# Patient Record
Sex: MALE | Race: WHITE | NOT HISPANIC OR LATINO | Employment: FULL TIME | URBAN - METROPOLITAN AREA
[De-identification: names, ages, dates, MRNs, and addresses within clinical notes are randomized per-mention and may not be internally consistent; named-entity substitution may affect disease eponyms.]

---

## 2021-12-21 ENCOUNTER — TRANSCRIBE ORDERS (OUTPATIENT)
Dept: CARDIOLOGY | Facility: CLINIC | Age: 31
End: 2021-12-21

## 2021-12-21 DIAGNOSIS — Z00.00 PHYSICAL EXAM: Primary | ICD-10-CM

## 2021-12-28 ENCOUNTER — HOSPITAL ENCOUNTER (OUTPATIENT)
Dept: CARDIOLOGY | Facility: HOSPITAL | Age: 31
Discharge: HOME | End: 2021-12-28

## 2021-12-28 DIAGNOSIS — Z00.00 PHYSICAL EXAM: ICD-10-CM

## 2021-12-28 LAB
BH CV STRESS BP STAGE 1: NORMAL
BH CV STRESS BP STAGE 2: NORMAL
BH CV STRESS BP STAGE 3: NORMAL
BH CV STRESS DURATION MIN STAGE 1: 3
BH CV STRESS DURATION MIN STAGE 2: 3
BH CV STRESS DURATION MIN STAGE 3: 3
BH CV STRESS DURATION SEC STAGE 1: 0
BH CV STRESS DURATION SEC STAGE 2: 0
BH CV STRESS DURATION SEC STAGE 3: 0
BH CV STRESS GRADE STAGE 1: 10
BH CV STRESS GRADE STAGE 2: 12
BH CV STRESS GRADE STAGE 3: 14
BH CV STRESS HR STAGE 1: 95
BH CV STRESS HR STAGE 2: 113
BH CV STRESS HR STAGE 3: 150
BH CV STRESS METS STAGE 1: 5
BH CV STRESS METS STAGE 2: 7.5
BH CV STRESS METS STAGE 3: 10
BH CV STRESS PROTOCOL 1: NORMAL
BH CV STRESS RECOVERY BP: NORMAL MMHG
BH CV STRESS RECOVERY HR: 100 BPM
BH CV STRESS SPEED STAGE 1: 1.7
BH CV STRESS SPEED STAGE 2: 2.5
BH CV STRESS SPEED STAGE 3: 3.4
BH CV STRESS STAGE 1: 1
BH CV STRESS STAGE 2: 2
BH CV STRESS STAGE 3: 3
MAXIMAL PREDICTED HEART RATE: 190 BPM
PERCENT MAX PREDICTED HR: 78.95 %
STRESS BASELINE BP: NORMAL MMHG
STRESS BASELINE HR: 75 BPM
STRESS PERCENT HR: 93 %
STRESS POST ESTIMATED WORKLOAD: 11 METS
STRESS POST EXERCISE DUR MIN: 10 MIN
STRESS POST EXERCISE DUR SEC: 30 SEC
STRESS POST PEAK BP: NORMAL MMHG
STRESS POST PEAK HR: 150 BPM
STRESS TARGET HR: 162 BPM

## 2021-12-28 PROCEDURE — 93017 CV STRESS TEST TRACING ONLY: CPT

## 2021-12-28 PROCEDURE — 93018 CV STRESS TEST I&R ONLY: CPT | Performed by: INTERNAL MEDICINE

## 2021-12-28 PROCEDURE — 93016 CV STRESS TEST SUPVJ ONLY: CPT | Performed by: INTERNAL MEDICINE

## 2022-03-11 ENCOUNTER — OFFICE VISIT (OUTPATIENT)
Dept: FAMILY MEDICINE CLINIC | Facility: CLINIC | Age: 32
End: 2022-03-11

## 2022-03-11 VITALS
HEIGHT: 71 IN | SYSTOLIC BLOOD PRESSURE: 124 MMHG | BODY MASS INDEX: 29.12 KG/M2 | WEIGHT: 208 LBS | TEMPERATURE: 97 F | RESPIRATION RATE: 16 BRPM | DIASTOLIC BLOOD PRESSURE: 79 MMHG | OXYGEN SATURATION: 99 % | HEART RATE: 68 BPM

## 2022-03-11 DIAGNOSIS — E78.2 MODERATE MIXED HYPERLIPIDEMIA NOT REQUIRING STATIN THERAPY: ICD-10-CM

## 2022-03-11 DIAGNOSIS — F90.9 ATTENTION DEFICIT HYPERACTIVITY DISORDER (ADHD), UNSPECIFIED ADHD TYPE: ICD-10-CM

## 2022-03-11 DIAGNOSIS — M25.561 ACUTE PAIN OF RIGHT KNEE: Primary | ICD-10-CM

## 2022-03-11 PROCEDURE — 73560 X-RAY EXAM OF KNEE 1 OR 2: CPT | Performed by: NURSE PRACTITIONER

## 2022-03-11 PROCEDURE — 99203 OFFICE O/P NEW LOW 30 MIN: CPT | Performed by: NURSE PRACTITIONER

## 2022-03-11 NOTE — PROGRESS NOTES
"Chief Complaint  Establish Care and Knee Pain    Subjective          Delfino presents to [facility] PRIMARY [facility]    31 year old male presented to the clinic today to establish care and to discuss some right knee pain.  He has had a previous right knee injury in high school.  He has not had any problems with this recently.  Yesterday he states that he hyper-extended his knee and fell and has been having some knee pain since that time.  He denies any swelling.  No bruising or laceration.  Pain is aching and intermittent 5/10.  He has not taken any medication or worn a brace to see if this helps.  Movement and climbing make it worse.      He is requesting an xray at this time.    He also was diagnosed with ADHD as a child.  He has not taken any medication since that time.  He would like a referral to be evaluated to possibly resume some medication.  He is having an increased hard time focusing and completing tasks at a new job.      He had recent lab work complete with his new job at [employer].    He has no other c/o today      Review of Systems   Constitutional: Negative for chills, fatigue and fever.   Eyes: Negative for visual disturbance.   Respiratory: Negative for cough and shortness of breath.    Gastrointestinal: Negative for abdominal pain, diarrhea, nausea and vomiting.   Musculoskeletal: Positive for arthralgias. Negative for joint swelling.   Neurological: Negative for dizziness and numbness.        Objective   Vital Signs:   Vitals:    03/11/22 0808   BP: 124/79   Pulse: 68   Resp: 16   Temp: 97 °F (36.1 °C)   TempSrc: Skin   SpO2: 99%   Weight: 94.3 kg (208 lb)   Height: 180.3 cm (71\")        Physical Exam  Vitals reviewed.   Constitutional:       Appearance: Normal appearance. He is not ill-appearing.   HENT:      Head: Normocephalic.   Neck:      Vascular: No carotid bruit.   Cardiovascular:      Rate and Rhythm: Normal rate and regular rhythm.      Pulses: Normal pulses.      Heart " sounds: Normal heart sounds. No murmur heard.  Pulmonary:      Effort: Pulmonary effort is normal.      Breath sounds: Normal breath sounds.   Musculoskeletal:      Cervical back: Neck supple.      Right lower leg: No swelling or lacerations.        Legs:       Comments: Negative anterior and posterior drawer  No bruising, swelling , lacerations noted.  Non tender   Skin:     General: Skin is warm.   Neurological:      Mental Status: He is alert and oriented to person, place, and time.   Psychiatric:         Mood and Affect: Mood normal.         Behavior: Behavior normal.         Thought Content: Thought content normal.         Judgment: Judgment normal.          Result Review :     The following data was reviewed by: KAVITA Tyson on 03/11/2022:      [facility] Panel 3 (12/28/2021 10:48)- lipids elevated  Total 217  Trig 156  ldl  148  owrk on diet and exercise-  Repeat 6 months    HIV-1 / O / 2 Ag / Antibody 4th Generation (12/28/2021 10:48)          Assessment and Plan    Diagnoses and all orders for this visit:    1. Acute pain of right knee (Primary)  Comments:  injury 3/11/2022  [facility]ICE  recommend wearing a brace to work  declined PT at this time  xray pending radiology review  Orders:  -     XR Knee 1 or 2 View Right (In Office)    2. Attention deficit hyperactivity disorder (ADHD), unspecified ADHD type  Comments:  As a child.  No medication at this time  referral for evaluation  Orders:  -     Ambulatory Referral to Psychiatry  -     Comprehensive metabolic panel; Future  -     Lipid panel; Future  -     CBC & Differential; Future  -     T4, Free; Future  -     TSH; Future  -     Hepatitis C Antibody; Future    3. Moderate mixed hyperlipidemia not requiring statin therapy  -     Comprehensive metabolic panel; Future  -     Lipid panel; Future  -     CBC & Differential; Future  -     T4, Free; Future  -     TSH; Future  -     Hepatitis C Antibody; Future        Follow Up   Return in about 6 months  (around 9/11/2022) for Annual physical.  Patient was given instructions and counseling regarding his condition or for health maintenance advice. Please see specific information pulled into the AVS if appropriate.

## 2022-04-07 ENCOUNTER — APPOINTMENT (OUTPATIENT)
Dept: GENERAL RADIOLOGY | Facility: HOSPITAL | Age: 32
End: 2022-04-07

## 2022-04-07 PROCEDURE — 71046 X-RAY EXAM CHEST 2 VIEWS: CPT | Performed by: GENERAL PRACTICE

## 2022-12-07 ENCOUNTER — OFFICE VISIT (OUTPATIENT)
Dept: FAMILY MEDICINE CLINIC | Facility: CLINIC | Age: 32
End: 2022-12-07

## 2022-12-07 VITALS
TEMPERATURE: 97.4 F | HEART RATE: 57 BPM | WEIGHT: 203 LBS | SYSTOLIC BLOOD PRESSURE: 102 MMHG | BODY MASS INDEX: 28.42 KG/M2 | DIASTOLIC BLOOD PRESSURE: 59 MMHG | HEIGHT: 71 IN | RESPIRATION RATE: 16 BRPM | OXYGEN SATURATION: 99 %

## 2022-12-07 DIAGNOSIS — F90.9 ATTENTION DEFICIT HYPERACTIVITY DISORDER (ADHD), UNSPECIFIED ADHD TYPE: ICD-10-CM

## 2022-12-07 DIAGNOSIS — F43.10 PTSD (POST-TRAUMATIC STRESS DISORDER): ICD-10-CM

## 2022-12-07 DIAGNOSIS — R59.9 LYMPH NODE ENLARGEMENT: Primary | ICD-10-CM

## 2022-12-07 PROCEDURE — 99214 OFFICE O/P EST MOD 30 MIN: CPT | Performed by: NURSE PRACTITIONER

## 2022-12-07 RX ORDER — BUPROPION HYDROCHLORIDE 150 MG/1
150 TABLET ORAL DAILY
Qty: 30 TABLET | Refills: 1 | Status: SHIPPED | OUTPATIENT
Start: 2022-12-07 | End: 2023-02-05

## 2022-12-07 RX ORDER — DOXYCYCLINE 100 MG/1
100 TABLET ORAL 2 TIMES DAILY
Qty: 20 TABLET | Refills: 0 | Status: SHIPPED | OUTPATIENT
Start: 2022-12-07 | End: 2022-12-17

## 2022-12-07 NOTE — PROGRESS NOTES
"Chief Complaint  SWELLING IN GROIN AREA    Subjective          Delfino presents to [facility]  As a 31 year old male c/o swollen lymph node in the Groin area.  He states he went in for his annual physical at work and noticed some swelling in his groin area.  He denies any tenderness,  He states the one on the left was been there for a bout a month and he had not noticed the one on the right.  They are mobile, non tender.  He denies any recent illness or infection.    Denies any dysuria, rash, swelling, discharge.  No history of any STD.  He is monogamous with his wife.  He declined std testing today  He denies any cuts or abrasions on his lower body  NO fever, weight change, night sweats.      History of ADHD and PTSD is seeing a therapist, and they have discussed talking to his PCP about trying welbutrin.  He has not been on any antidepressant medication in the past, but would like to try this medication.  Denies any suicidal or homicidal ideations.    Delfino Cageal male 31 y.o., /59   Pulse 57   Temp 97.4 °F (36.3 °C) (Oral)   Resp 16   Ht 180.3 cm (71\")   Wt 92.1 kg (203 lb)   SpO2 99%   BMI 28.31 kg/m²   who presents today for follow up of PTSD and ADHD.  He reports excessive worry, unable to relax and irritable. Onset of symptoms was approximately several years ago.  He denies current suicidal and homicidal ideation. Risk factors are family history of anxiety and or depression, lifestyle of multiple roles, family situation/stress, job stress, prior diagnosis of depression and childhood ADD/ADHD.  Previous treatment includes seeing psychotherapist     He has no other c/o today  The following portions of the patient's history were reviewed and updated as appropriate: allergies, current medications, past family history, past medical history, past social history, past surgical history, and problem list          Review of Systems   Constitutional: Negative for chills, " "diaphoresis, fatigue and fever.   HENT: Negative for congestion.    Eyes: Negative for visual disturbance.   Respiratory: Negative for cough and shortness of breath.    Cardiovascular: Negative for chest pain, palpitations and leg swelling.   Genitourinary: Negative for decreased urine volume, discharge, dysuria, frequency, penile pain, penile swelling, scrotal swelling, testicular pain and urgency.   Neurological: Negative for dizziness and light-headedness.        Objective   Vital Signs:   Vitals:    12/07/22 0808   BP: 102/59   Pulse: 57   Resp: 16   Temp: 97.4 °F (36.3 °C)   TempSrc: Oral   SpO2: 99%   Weight: 92.1 kg (203 lb)   Height: 180.3 cm (71\")        BMI is >= 25 and <30. (Overweight) The following options were offered after discussion;: weight loss educational material (shared in after visit summary)        Physical Exam  Vitals reviewed.   Constitutional:       General: He is not in acute distress.  Eyes:      Conjunctiva/sclera: Conjunctivae normal.   Neck:      Thyroid: No thyromegaly.      Vascular: No carotid bruit.   Cardiovascular:      Rate and Rhythm: Normal rate and regular rhythm.      Heart sounds: Normal heart sounds.   Pulmonary:      Effort: Pulmonary effort is normal.      Breath sounds: Normal breath sounds.   Abdominal:      Hernia: There is no hernia in the left inguinal area or right inguinal area.   Genitourinary:      Lymphadenopathy:      Cervical: No cervical adenopathy.      Lower Body: Right inguinal adenopathy present. Left inguinal adenopathy present.   Neurological:      Mental Status: He is alert.   Psychiatric:         Attention and Perception: Attention normal.         Mood and Affect: Mood normal.          Result Review :     The following data was reviewed by: KAVITA Tyson on 12/07/2022:      Had recent labs with work physical-  Will have sent to office-  If cbc, cmp, ua/culture not included need these labs  Declined std testing.     Assessment and Plan  "   Diagnoses and all orders for this visit:    1. Lymph node enlargement (Primary)  Comments:  report any changes immediatly-  need labs from work physical  declined std testing  doxycycline and us ordered    Orders:  -     CBC & Differential  -     Comprehensive metabolic panel  -     doxycycline (ADOXA) 100 MG tablet; Take 1 tablet by mouth 2 (Two) Times a Day for 10 days.  Dispense: 20 tablet; Refill: 0  -     US Scrotum & Testicles; Future  -     Urinalysis With Microscopic - Urine, Clean Catch  -     Urine Culture - Urine, Urine, Clean Catch    2. Attention deficit hyperactivity disorder (ADHD), unspecified ADHD type  Comments:  trial wellbutrin-  denies si/hi  report any increased se  Orders:  -     buPROPion XL (WELLBUTRIN XL) 150 MG 24 hr tablet; Take 1 tablet by mouth Daily for 60 days.  Dispense: 30 tablet; Refill: 1    3. PTSD (post-traumatic stress disorder)  Comments:  trial wellbutrin-  denies si/hi  report any increased se  Orders:  -     buPROPion XL (WELLBUTRIN XL) 150 MG 24 hr tablet; Take 1 tablet by mouth Daily for 60 days.  Dispense: 30 tablet; Refill: 1        Follow Up   Return in about 1 month (around 1/7/2023), or if symptoms worsen or fail to improve.  Patient was given instructions and counseling regarding his condition or for health maintenance advice. Please see specific information pulled into the AVS if appropriate.

## 2022-12-13 ENCOUNTER — HOSPITAL ENCOUNTER (OUTPATIENT)
Dept: ULTRASOUND IMAGING | Facility: HOSPITAL | Age: 32
Discharge: HOME | End: 2022-12-13
Admitting: NURSE PRACTITIONER

## 2022-12-13 DIAGNOSIS — R59.9 LYMPH NODE ENLARGEMENT: ICD-10-CM

## 2022-12-13 PROCEDURE — 76882 US LMTD JT/FCL EVL NVASC XTR: CPT

## 2023-01-20 ENCOUNTER — OFFICE VISIT (OUTPATIENT)
Dept: FAMILY MEDICINE CLINIC | Facility: CLINIC | Age: 33
End: 2023-01-20
Payer: COMMERCIAL

## 2023-01-20 VITALS
RESPIRATION RATE: 16 BRPM | TEMPERATURE: 98.1 F | HEIGHT: 71 IN | DIASTOLIC BLOOD PRESSURE: 74 MMHG | BODY MASS INDEX: 27.44 KG/M2 | WEIGHT: 196 LBS | HEART RATE: 76 BPM | SYSTOLIC BLOOD PRESSURE: 138 MMHG

## 2023-01-20 DIAGNOSIS — F43.10 PTSD (POST-TRAUMATIC STRESS DISORDER): Primary | ICD-10-CM

## 2023-01-20 PROCEDURE — 99213 OFFICE O/P EST LOW 20 MIN: CPT | Performed by: NURSE PRACTITIONER

## 2023-01-20 NOTE — PROGRESS NOTES
"Chief Complaint  PTSD and Follow-up (Abnormal kari on fit for duty exam [facility] works)    Rudolph Saleh presents to [facility]     As a 32 year old male for follow up -  History of PTSD-  Takes Wellbutrin, works well no medication side effects.  Denies any SI/HI  PHQ-2 Depression Screening  Little interest or pleasure in doing things? 0-->not at all   Feeling down, depressed, or hopeless? 0-->not at all   PHQ-2 Total Score 0     Has a \"fit for duty \" exam at [facility] this week, and had an abnormal ECG.  He is no C/o  No HA, blurred vision, dizziness, flushing, no chest pain or pressure.  He has no history of any cardiac concerns.  No previous ECG available to compare.    He has an appointment set up with cardiology for follow up/clearance on 01/25/2023    He has no other C/o today    The following portions of the patient's history were reviewed and updated as appropriate: allergies, current medications, past family history, past medical history, past social history, past surgical history, and problem list       Objective   Vital Signs:   Vitals:    01/20/23 0831   BP: 138/74   Pulse: 76   Resp: 16   Temp: 98.1 °F (36.7 °C)   TempSrc: Skin   Weight: 88.9 kg (196 lb)   Height: 180.3 cm (71\")                Physical Exam  Vitals reviewed.   Constitutional:       General: He is not in acute distress.  Eyes:      Conjunctiva/sclera: Conjunctivae normal.   Neck:      Thyroid: No thyromegaly.      Vascular: No carotid bruit.   Cardiovascular:      Rate and Rhythm: Normal rate and regular rhythm.      Pulses: Normal pulses.      Heart sounds: Normal heart sounds. No murmur heard.    No friction rub. No gallop.   Pulmonary:      Effort: Pulmonary effort is normal.      Breath sounds: Normal breath sounds.   Lymphadenopathy:      Cervical: No cervical adenopathy.   Neurological:      Mental Status: He is alert.   Psychiatric:         Attention and Perception: Attention normal.    "      Mood and Affect: Mood normal.          Result Review :     The following data was reviewed by: KAVITA Tyson on 01/20/2023:      EKG showed sinus Rhythm  APC, sinus pause, left posterior fascicular block, anterior infarct old, minimal st elevation, lateral leads     Assessment and Plan    Diagnoses and all orders for this visit:    1. PTSD (post-traumatic stress disorder) (Primary)  Comments:  controlled-  denies any SI/HI  continue wellbutrin-  works well      Keep scheduled follow up with cardiology for clearance  Follow up immediately with any changes  To ER with any chest pain/ pressure    Follow Up   Return in about 6 months (around 7/20/2023), or if symptoms worsen or fail to improve, for Annual physical.  Patient was given instructions and counseling regarding his condition or for health maintenance advice. Please see specific information pulled into the AVS if appropriate.

## 2023-01-25 ENCOUNTER — OFFICE VISIT (OUTPATIENT)
Dept: CARDIOLOGY | Facility: CLINIC | Age: 33
End: 2023-01-25
Payer: COMMERCIAL

## 2023-01-25 VITALS
DIASTOLIC BLOOD PRESSURE: 84 MMHG | HEART RATE: 71 BPM | OXYGEN SATURATION: 98 % | BODY MASS INDEX: 27.6 KG/M2 | SYSTOLIC BLOOD PRESSURE: 126 MMHG | HEIGHT: 72 IN | WEIGHT: 203.8 LBS

## 2023-01-25 DIAGNOSIS — E78.00 HYPERCHOLESTEROLEMIA: ICD-10-CM

## 2023-01-25 DIAGNOSIS — R94.31 ABNORMAL EKG: Primary | ICD-10-CM

## 2023-01-25 PROCEDURE — 93000 ELECTROCARDIOGRAM COMPLETE: CPT | Performed by: INTERNAL MEDICINE

## 2023-01-25 PROCEDURE — 99204 OFFICE O/P NEW MOD 45 MIN: CPT | Performed by: INTERNAL MEDICINE

## 2023-01-25 NOTE — LETTER
January 25, 2023     KAVITA Tyson  [address]  [address]  [place] [address]    Patient: Delfino Pinto   YOB: 1990   Date of Visit: 1/25/2023       Dear KAVITA Tyson:    Thank you for referring Delfino Pinto to me for evaluation. Below are the relevant portions of my assessment and plan of care.    Encounter Diagnosis and Orders:  Diagnoses and all orders for this visit:    1. Abnormal EKG (Primary)  -     ECG 12 Lead  -     Adult Transthoracic Echo Complete w/ Color, Spectral and Contrast if Necessary Per Protocol; Future    2. Hypercholesterolemia        If you have questions, please do not hesitate to call me. I look forward to following Delfino along with you.         Sincerely,        Brody Rubio MD        CC: No Recipients

## 2023-01-25 NOTE — PROGRESS NOTES
PATIENTINFORMATION    Date of Office Visit: 2023  Encounter Provider: Brody Rubio MD  Place of Service: [facility] CARDIOLOGY  Patient Name: Delfino Pinto  : 1990    Subjective:     Encounter Date:2023      Patient ID: Delfino Pinto is a 32 y.o. male.    Chief Complaint   Patient presents with   • Abnormal ECG     Clearance for new FD job w [employer]     HPI  Mr. Pinto a pleasant 32 years old  who came to cardiac clinic for work clearance because of recently noted abnormal EKG.  He runs 5 miles every day and takes him about 40 minutes to finish without any limiting symptoms.  He had a normal 9-minute treadmill test recently.  No rest or exertional chest pain, shortness of breath, orthopnea, PND, palpitations, presyncope syncope or extremity swelling.  No known cardiovascular illness in the past.  He is known to have abnormal lipid panel for several years.  His father has abnormal cholesterol but denies atherosclerotic artery disease.    No tobacco use, recreational drug use or alcohol abuse      ROS  All systems reviewed and negative except as noted in HPI.    Past Medical History:   Diagnosis Date   • ADHD (attention deficit hyperactivity disorder)        Past Surgical History:   Procedure Laterality Date   • APPENDECTOMY     • TONSILLECTOMY     • WISDOM TOOTH EXTRACTION         Social History     Socioeconomic History   • Marital status:    • Number of children: 2   Tobacco Use   • Smoking status: Former   • Smokeless tobacco: Current   • Tobacco comments:     Caffeine use   Vaping Use   • Vaping Use: Never used   Substance and Sexual Activity   • Alcohol use: Yes     Comment: beer   • Drug use: Never       History reviewed. No pertinent family history.        ECG 12 Lead    Date/Time: 2023 9:35 AM  Performed by: Brody Rubio MD  Authorized by: Brody Rubio MD   Comparison: compared with previous ECG from 2023  Similar to  "previous ECG  Rhythm: sinus rhythm  Ectopy: atrial premature contractions  Rate: normal  Conduction: left posterior fascicular block  Q waves: V1, V2 and V3    ST Segments: ST segments normal  T Waves: T waves normal  QRS axis: normal  Other: no other findings  Other findings: poor R wave progression    Clinical impression: abnormal EKG               Objective:     /84 (BP Location: Left arm, Patient Position: Sitting, Cuff Size: Large Adult)   Pulse 71   Ht 182.9 cm (72\")   Wt 92.4 kg (203 lb 12.8 oz)   SpO2 98%   BMI 27.64 kg/m²  Body mass index is 27.64 kg/m².     Constitutional:       General: Not in acute distress.     Appearance: Well-developed. Not diaphoretic.   Eyes:      Pupils: Pupils are equal, round, and reactive to light.   HENT:      Head: Normocephalic and atraumatic.   Neck:      Thyroid: No thyromegaly.   Pulmonary:      Effort: Pulmonary effort is normal. No respiratory distress.      Breath sounds: Normal breath sounds. No wheezing. No rales.   Chest:      Chest wall: Not tender to palpatation.   Cardiovascular:      Normal rate. Regular rhythm.      No gallop.   Pulses:     Intact distal pulses.   Edema:     Peripheral edema absent.   Abdominal:      General: Bowel sounds are normal. There is no distension.      Palpations: Abdomen is soft.      Tenderness: There is no guarding.   Musculoskeletal: Normal range of motion.         General: No deformity.      Cervical back: Normal range of motion and neck supple. Skin:     General: Skin is warm and dry.      Findings: No rash.   Neurological:      Mental Status: Alert and oriented to person, place, and time.      Cranial Nerves: No cranial nerve deficit.      Deep Tendon Reflexes: Reflexes are normal and symmetric.   Psychiatric:         Judgment: Judgment normal.         Review Of Data: I have reviewed pertinent recent labs, images and documents and pertinent findings included in HPI or assessment below.    Lipid Panel    Lipid Panel " 12/5/22 1/19/23   Total Cholesterol 204 (A) 213 (A)   Triglycerides 76 68   HDL Cholesterol 41 45   VLDL Cholesterol 14 12   LDL Cholesterol  149 (A) 156 (A)   LDL/HDL Ratio 3.60 3.43   (A) Abnormal value                Assessment/Plan:         1.  Abnormal EKG-with anteroseptal Q waves, PACs, left posterior fascicular block  Delfino is very active and exercise regularly without any limiting or concerning symptoms  Get echocardiogram for evaluation of abnormal EKG.  No further stress testing as he had a normal stress test recently  He can return to duties as a  without any limitations at this point    2.  Hypercholesterolemia-strongly recommend annual lipid panel with PCP  We have discussed about lifestyle changes and eating healthier/Mediterranean type of diet  He would benefit from doing coronary calcium score and vascular screening to rule out any atherosclerotic disease.  If he has any evidence for  early onset atherosclerotic disease he should be started on statin/aspirin    Diagnosis and plan of care discussed with patient and verbalized understanding.            Your medication list          Accurate as of January 25, 2023 12:26 PM. If you have any questions, ask your nurse or doctor.            CONTINUE taking these medications      Instructions Last Dose Given Next Dose Due   buPROPion  MG 24 hr tablet  Commonly known as: WELLBUTRIN XL      Take 1 tablet by mouth Daily for 60 days.                  Brody Rubio MD  01/25/23  12:26 EST

## 2024-01-11 ENCOUNTER — TELEPHONE (OUTPATIENT)
Dept: CARDIOLOGY | Facility: CLINIC | Age: 34
End: 2024-01-11

## 2024-01-11 NOTE — TELEPHONE ENCOUNTER
Caller: Venkata Pinto    Relationship: Self    Best call back number: [phone]     What is the best time to reach you: ANYTIME    Do you know the name of the person who called: VENKATA    What was the call regarding: PT ADDED THAT WOULD LIKE A CALLBACK WHEN THIS IS SENT.  THEY ARE NEEDING IT TODAY FOR HIS WORK

## 2024-01-11 NOTE — TELEPHONE ENCOUNTER
Caller: VENKATA    Relationship: SELF    Best call back number: [phone]    What is the best time to reach you: ANY        What was the call regarding:PT IS A  AND STATED WORK CAN NOT FIND HIS TESTING/CLEARANCE FROM LAST YEAR. PLEASE SEND THE CLEARANCE LETTER AND ECHO TO --- FAX: [phone].

## 2024-01-11 NOTE — TELEPHONE ENCOUNTER
Caller: Blair Delfino    Relationship: Self    Best call back number: [phone]    What is the best time to reach you: any    Who are you requesting to speak with (clinical staff, provider,  specific staff member): clinical    Do you know the name of the person who called: natalya    What was the call regarding: paperwork    Is it okay if the provider responds through MyChart: no

## 2024-01-12 ENCOUNTER — TELEPHONE (OUTPATIENT)
Dept: FAMILY MEDICINE CLINIC | Facility: CLINIC | Age: 34
End: 2024-01-12

## 2024-01-22 DIAGNOSIS — F90.9 ATTENTION DEFICIT HYPERACTIVITY DISORDER (ADHD), UNSPECIFIED ADHD TYPE: ICD-10-CM

## 2024-01-22 DIAGNOSIS — F43.10 PTSD (POST-TRAUMATIC STRESS DISORDER): ICD-10-CM

## 2024-01-22 RX ORDER — BUPROPION HYDROCHLORIDE 150 MG/1
150 TABLET ORAL DAILY
Qty: 30 TABLET | Refills: 1 | OUTPATIENT
Start: 2024-01-22 | End: 2024-03-22

## 2024-01-22 NOTE — TELEPHONE ENCOUNTER
Caller: Delfino Pinto    Relationship: Self    Best call back number: [phone]     Requested Prescriptions:   Requested Prescriptions     Pending Prescriptions Disp Refills    buPROPion XL (WELLBUTRIN XL) 150 MG 24 hr tablet 30 tablet 1     Sig: Take 1 tablet by mouth Daily for 60 days.        Pharmacy where request should be sent: [address] [phone] [facility] [phone] FX     Last office visit with prescribing clinician: 1/20/2023   Last telemedicine visit with prescribing clinician: Visit date not found   Next office visit with prescribing clinician: Visit date not found     Additional details provided by patient: PATIENT HAS 5 TABLETS REMAINING.     Does the patient have less than a 3 day supply:  [] Yes  [x] No    Would you like a call back once the refill request has been completed: [] Yes [x] No      Bessy Blank Rep   01/22/24 09:19 EST

## 2024-02-12 ENCOUNTER — OFFICE VISIT (OUTPATIENT)
Dept: FAMILY MEDICINE CLINIC | Facility: CLINIC | Age: 34
End: 2024-02-12
Payer: COMMERCIAL

## 2024-02-12 VITALS
SYSTOLIC BLOOD PRESSURE: 120 MMHG | TEMPERATURE: 97.3 F | OXYGEN SATURATION: 97 % | RESPIRATION RATE: 16 BRPM | HEART RATE: 63 BPM | DIASTOLIC BLOOD PRESSURE: 60 MMHG | BODY MASS INDEX: 28.17 KG/M2 | WEIGHT: 208 LBS | HEIGHT: 72 IN

## 2024-02-12 DIAGNOSIS — F90.9 ATTENTION DEFICIT HYPERACTIVITY DISORDER (ADHD), UNSPECIFIED ADHD TYPE: ICD-10-CM

## 2024-02-12 DIAGNOSIS — R53.83 OTHER FATIGUE: ICD-10-CM

## 2024-02-12 DIAGNOSIS — F41.9 ANXIETY: Primary | ICD-10-CM

## 2024-02-12 DIAGNOSIS — F43.10 PTSD (POST-TRAUMATIC STRESS DISORDER): ICD-10-CM

## 2024-02-12 PROCEDURE — 99214 OFFICE O/P EST MOD 30 MIN: CPT | Performed by: NURSE PRACTITIONER

## 2024-02-12 RX ORDER — ESCITALOPRAM OXALATE 10 MG/1
10 TABLET ORAL DAILY
Qty: 30 TABLET | Refills: 2 | Status: SHIPPED | OUTPATIENT
Start: 2024-02-12 | End: 2024-05-12

## 2024-02-14 LAB
TESTOST FREE SERPL-MCNC: 7.3 PG/ML (ref 8.7–25.1)
TESTOST SERPL-MCNC: 260 NG/DL (ref 264–916)

## 2024-02-15 ENCOUNTER — TELEPHONE (OUTPATIENT)
Dept: FAMILY MEDICINE CLINIC | Facility: CLINIC | Age: 34
End: 2024-02-15
Payer: COMMERCIAL

## 2024-02-15 DIAGNOSIS — R79.89 LOW TESTOSTERONE: Primary | ICD-10-CM

## 2024-06-04 DIAGNOSIS — F41.9 ANXIETY: ICD-10-CM

## 2024-06-05 RX ORDER — ESCITALOPRAM OXALATE 10 MG/1
10 TABLET ORAL DAILY
Qty: 15 TABLET | Refills: 0 | Status: SHIPPED | OUTPATIENT
Start: 2024-06-05 | End: 2024-09-03

## 2024-06-05 NOTE — TELEPHONE ENCOUNTER
Last Ov- 2/12/2024   Nex OV- Visit date not found       Return in about 6 weeks (around 3/25/2024) for follow up anxiety.

## 2024-11-21 ENCOUNTER — OFFICE VISIT (OUTPATIENT)
Dept: FAMILY MEDICINE CLINIC | Facility: CLINIC | Age: 34
End: 2024-11-21
Payer: COMMERCIAL

## 2024-11-21 VITALS
HEART RATE: 87 BPM | SYSTOLIC BLOOD PRESSURE: 124 MMHG | OXYGEN SATURATION: 99 % | HEIGHT: 72 IN | BODY MASS INDEX: 28.44 KG/M2 | TEMPERATURE: 97.8 F | DIASTOLIC BLOOD PRESSURE: 76 MMHG | RESPIRATION RATE: 16 BRPM | WEIGHT: 210 LBS

## 2024-11-21 DIAGNOSIS — R05.2 SUBACUTE COUGH: Primary | ICD-10-CM

## 2024-11-21 PROCEDURE — 99213 OFFICE O/P EST LOW 20 MIN: CPT | Performed by: FAMILY MEDICINE

## 2024-11-21 RX ORDER — TESTOSTERONE CYPIONATE 200 MG/ML
100 INJECTION, SOLUTION INTRAMUSCULAR
COMMUNITY
Start: 2024-11-08

## 2024-11-21 RX ORDER — SYRINGE WITH NEEDLE, 1 ML 25GX5/8"
SYRINGE, EMPTY DISPOSABLE MISCELLANEOUS
COMMUNITY
Start: 2024-08-28

## 2024-11-21 RX ORDER — METHYLPREDNISOLONE 4 MG/1
TABLET ORAL
Qty: 21 TABLET | Refills: 0 | Status: SHIPPED | OUTPATIENT
Start: 2024-11-21

## 2024-11-21 RX ORDER — ALBUTEROL SULFATE 90 UG/1
INHALANT RESPIRATORY (INHALATION)
Qty: 8 G | Refills: 1 | Status: SHIPPED | OUTPATIENT
Start: 2024-11-21

## 2024-11-21 RX ORDER — NEEDLES, DISPOSABLE 25GX5/8"
NEEDLE, DISPOSABLE MISCELLANEOUS
COMMUNITY
Start: 2024-08-28

## 2024-11-21 NOTE — PROGRESS NOTES
"Subjective   Delfino Pinto is a 33 y.o. male.     CC: Inhalation Exposure/Cough    History of Present Illness     Patient of Luis [facility]to comes to see me today reporting being out of town as a  doing training, and had been around a decent amount of dust and some smoke during his training, although was wearing a respirator.  Patient is a very fit young man and runs 5 miles a day, and this morning noticed that although he has been coughing a little bit for the last several weeks, when he went for his run this morning he did have some substernal chest tightness and shortness of breath, which resolved once he went inside and rested for about an hour.  Patient has no prior history of asthma.  Patient denies fever/chills.        The following portions of the patient's history were reviewed and updated as appropriate: allergies, current medications, past family history, past medical history, past social history, past surgical history, and problem list.    Review of Systems   Constitutional:  Negative for activity change, chills and fever.   Respiratory:  Positive for cough and shortness of breath (with exertion this am).    Cardiovascular:  Negative for chest pain.   Psychiatric/Behavioral:  Negative for dysphoric mood.        /76   Pulse 87   Temp 97.8 °F (36.6 °C) (Oral)   Resp 16   Ht 182.9 cm (72\")   Wt 95.3 kg (210 lb)   SpO2 99%   BMI 28.48 kg/m²     Objective   Physical Exam  Vitals and nursing note reviewed.   Constitutional:       General: He is not in acute distress.     Appearance: He is well-developed.   Cardiovascular:      Rate and Rhythm: Normal rate and regular rhythm.   Pulmonary:      Effort: Pulmonary effort is normal.      Breath sounds: Normal breath sounds.   Neurological:      Mental Status: He is alert and oriented to person, place, and time.   Psychiatric:         Behavior: Behavior normal.         Thought Content: Thought content normal.         Assessment & Plan   Diagnoses and " all orders for this visit:    1. Subacute cough (Primary)  -     methylPREDNISolone (Medrol) 4 MG dose pack; follow package directions  Dispense: 21 tablet; Refill: 0  -     albuterol sulfate  (90 Base) MCG/ACT inhaler; Take 1-2 puffs 15 minutes prior to exercise.  Dispense: 8 g; Refill: 1    Patient declines chest x-ray today.

## 2025-01-15 ENCOUNTER — TRANSCRIBE ORDERS (OUTPATIENT)
Dept: CARDIOLOGY | Facility: CLINIC | Age: 35
End: 2025-01-15
Payer: COMMERCIAL

## 2025-01-15 DIAGNOSIS — Z00.00 PHYSICAL EXAM: Primary | ICD-10-CM

## 2025-02-18 ENCOUNTER — OFFICE VISIT (OUTPATIENT)
Dept: CARDIOLOGY | Facility: CLINIC | Age: 35
End: 2025-02-18
Payer: COMMERCIAL

## 2025-02-18 VITALS
OXYGEN SATURATION: 99 % | SYSTOLIC BLOOD PRESSURE: 161 MMHG | HEIGHT: 72 IN | BODY MASS INDEX: 27.77 KG/M2 | HEART RATE: 81 BPM | DIASTOLIC BLOOD PRESSURE: 98 MMHG | WEIGHT: 205 LBS

## 2025-02-18 DIAGNOSIS — E78.00 HYPERCHOLESTEROLEMIA: Primary | ICD-10-CM

## 2025-02-18 DIAGNOSIS — R94.31 ABNORMAL EKG: ICD-10-CM

## 2025-02-18 DIAGNOSIS — F90.0 ATTENTION DEFICIT HYPERACTIVITY DISORDER (ADHD), PREDOMINANTLY INATTENTIVE TYPE: ICD-10-CM

## 2025-02-18 PROCEDURE — 99214 OFFICE O/P EST MOD 30 MIN: CPT | Performed by: INTERNAL MEDICINE

## 2025-02-18 PROCEDURE — 93000 ELECTROCARDIOGRAM COMPLETE: CPT | Performed by: INTERNAL MEDICINE

## 2025-02-18 RX ORDER — LISDEXAMFETAMINE DIMESYLATE 30 MG/1
30 CAPSULE ORAL EVERY MORNING
COMMUNITY

## 2025-02-18 NOTE — PROGRESS NOTES
"PATIENTINFORMATION    Date of Office Visit: 2025  Encounter Provider: Brody Rubio MD  Place of Service: [facility] CARDIOLOGY  Patient Name: Delfino Pinto  : 1990    Subjective:     Encounter Date:2025      Patient ID: Delfino Pinto is a 34 y.o. male.    Chief Complaint   Patient presents with    Follow-up       HPI  Mr. Pinto is pleasant 35 yo gentleman who came to cardiac clinic for follow-up visit, and also to get clearance for starting Vyvanse for ADHD.  He is very active and exercise regularly and actually came from running 5 mile earlier this morning in cold weather.  No chest discomfort, palpitations, presyncope syncope.  No recent ER visit   He is a        ROS  All systems reviewed and negative except as noted in HPI.    Past Medical History:   Diagnosis Date    ADHD (attention deficit hyperactivity disorder)        Past Surgical History:   Procedure Laterality Date    APPENDECTOMY      TONSILLECTOMY      WISDOM TOOTH EXTRACTION         Social History     Socioeconomic History    Marital status:     Number of children: 2   Tobacco Use    Smoking status: Former    Smokeless tobacco: Current    Tobacco comments:     Caffeine use   Vaping Use    Vaping status: Never Used   Substance and Sexual Activity    Alcohol use: Yes     Comment: beer    Drug use: Never       History reviewed. No pertinent family history.        ECG 12 Lead    Date/Time: 2025 4:37 PM  Performed by: Brody Rubio MD    Authorized by: Brody Rubio MD  Comparison: compared with previous ECG from 2023  Similar to previous ECG  Rhythm: sinus rhythm  Rate: normal  Conduction: left anterior fascicular block  ST Segments: ST segments normal  T Waves: T waves normal  QRS axis: normal  Other: no other findings    Clinical impression: abnormal EKG             Objective:     /98   Pulse 81   Ht 182.9 cm (72\")   Wt 93 kg (205 lb)   SpO2 99%   BMI " 27.80 kg/m²  Body mass index is 27.8 kg/m².     Constitutional:       General: Not in acute distress.     Appearance: Well-developed. Not diaphoretic.   Eyes:      Pupils: Pupils are equal, round, and reactive to light.   HENT:      Head: Normocephalic and atraumatic.   Neck:      Thyroid: No thyromegaly.   Pulmonary:      Effort: Pulmonary effort is normal. No respiratory distress.      Breath sounds: Normal breath sounds. No wheezing. No rales.   Chest:      Chest wall: Not tender to palpatation.   Cardiovascular:      Normal rate. Regular rhythm.      No gallop.    Pulses:     Intact distal pulses.   Edema:     Peripheral edema absent.   Abdominal:      General: Bowel sounds are normal. There is no distension.      Palpations: Abdomen is soft.      Tenderness: There is no guarding.   Musculoskeletal: Normal range of motion.         General: No deformity.      Cervical back: Normal range of motion and neck supple. Skin:     General: Skin is warm and dry.      Findings: No rash.   Neurological:      Mental Status: Alert and oriented to person, place, and time.      Cranial Nerves: No cranial nerve deficit.      Deep Tendon Reflexes: Reflexes are normal and symmetric.   Psychiatric:         Judgment: Judgment normal.         Review Of Data: I have reviewed pertinent recent labs, images and documents and pertinent findings included in HPI or assessment below.    Lipid Panel          1/23/2025    09:45   Lipid Panel   Total Cholesterol 228    Triglycerides 67    HDL Cholesterol 48    VLDL Cholesterol 12    LDL Cholesterol  168    LDL/HDL Ratio 3.47          Assessment/Plan:     Abnormal EKG-with anteroseptal Q waves, PACs, left posterior fascicular block-normal 10-minute treadmill test in 2021. Repeat EKG unchanged.  Hypercholesterolemia-Probably familial     Delfino is doing  very well from cardiology standpoint point.  At this point there are no cardiac contraindications for using Vyvanse to treat ADHD.  Also there  "are no cardiac related restrictions in carrying out  activities and responsibilities as a .  We have discussed about benefits of vascular screening including coronary calcium score because of significant hypercholesterolemia.He will benefit from long term statin therapy if LDL is persistently elevated.  He will continue to exercise regularly.  FU in one year       Diagnosis and plan of care discussed with patient and verbalized understanding.            Your medication list            Accurate as of February 18, 2025  4:38 PM. If you have any questions, ask your nurse or doctor.                CONTINUE taking these medications        Instructions Last Dose Given Next Dose Due   albuterol sulfate  (90 Base) MCG/ACT inhaler  Commonly known as: PROVENTIL HFA;VENTOLIN HFA;PROAIR HFA      Take 1-2 puffs 15 minutes prior to exercise.       B-D 3CC LUER-DELBERT SYR 21NC0-5/2 22G X 1-1/2\" 3 ML misc  Generic drug: Syringe/Needle (Disp)      USE TO INJECT TESTOSTERONE ONCE WEEKLY       BD Hypodermic Needle 18G X 1\" misc  Generic drug: Needle (Disp)      USE TO INJECT TESTOSTERONE ONCE WEEKLY       escitalopram 10 MG tablet  Commonly known as: LEXAPRO      Take 1 tablet by mouth once daily for 90 days       Testosterone Cypionate 200 MG/ML injection  Commonly known as: DEPOTESTOTERONE CYPIONATE      Inject 0.5 mL into the appropriate muscle as directed by prescriber Every 14 (Fourteen) Days.       Vyvanse 30 MG capsule  Generic drug: lisdexamfetamine      Take 1 capsule by mouth Every Morning              STOP taking these medications      buPROPion  MG 24 hr tablet  Commonly known as: WELLBUTRIN XL  Stopped by: Brody Rubio        methylPREDNISolone 4 MG dose pack  Commonly known as: Medrol  Stopped by: Brody Rubio MD  02/18/25  16:38 EST    "

## 2025-02-21 RX ORDER — LISDEXAMFETAMINE DIMESYLATE 30 MG/1
30 CAPSULE ORAL EVERY MORNING
OUTPATIENT
Start: 2025-02-21

## 2025-02-21 NOTE — TELEPHONE ENCOUNTER
Caller: Delfino Pinto    Relationship: Self    Best call back number: [phone]    Requested Prescriptions:   Requested Prescriptions     Pending Prescriptions Disp Refills    lisdexamfetamine (Vyvanse) 30 MG capsule       Sig: Take 1 capsule by mouth Every Morning        [facility] [address] [phone] [phone] FX     Last office visit with prescribing clinician: 2/12/2024   Last telemedicine visit with prescribing clinician: Visit date not found   Next office visit with prescribing clinician: Visit date not found     Additional details provided by patient: PATIENT WOULD LIKE TO KNOW IF [facility] CAN REFILL THIS OR IF PATIENT NEEDS TO BE REFERRED OUT TO PSYCHIATRIST. PATIENT STATES THAT THE ONE HE IS SEEING CURRENTLY IS NO LONGER IN NETWORK WITH HIS PLAN. PATIENT HAS A WEEK LEFT.     Does the patient have less than a 3 day supply:  [] Yes  [x] No    Bessy Barillas Rep   02/21/25 11:08 EST

## 2025-03-04 ENCOUNTER — OFFICE VISIT (OUTPATIENT)
Dept: FAMILY MEDICINE CLINIC | Facility: CLINIC | Age: 35
End: 2025-03-04
Payer: COMMERCIAL

## 2025-03-04 VITALS
HEIGHT: 72 IN | BODY MASS INDEX: 28.17 KG/M2 | DIASTOLIC BLOOD PRESSURE: 78 MMHG | WEIGHT: 208 LBS | SYSTOLIC BLOOD PRESSURE: 122 MMHG | TEMPERATURE: 96.3 F | OXYGEN SATURATION: 98 % | HEART RATE: 70 BPM

## 2025-03-04 DIAGNOSIS — F43.10 PTSD (POST-TRAUMATIC STRESS DISORDER): ICD-10-CM

## 2025-03-04 DIAGNOSIS — F41.9 ANXIETY: Primary | ICD-10-CM

## 2025-03-04 DIAGNOSIS — F90.9 ATTENTION DEFICIT HYPERACTIVITY DISORDER (ADHD), UNSPECIFIED ADHD TYPE: ICD-10-CM

## 2025-03-04 DIAGNOSIS — Z79.899 HIGH RISK MEDICATION USE: ICD-10-CM

## 2025-03-04 DIAGNOSIS — E78.2 MIXED HYPERLIPIDEMIA: ICD-10-CM

## 2025-03-04 PROCEDURE — 99214 OFFICE O/P EST MOD 30 MIN: CPT | Performed by: NURSE PRACTITIONER

## 2025-03-04 NOTE — PROGRESS NOTES
Chief Complaint  Med Refill, Follow-up, ADD, Anxiety, and Hyperlipidemia    Subjective        HPI   Delifno presents to [facility]  as a 34-year-old male for follow-up on chronic conditions, to refill medications, signed controlled substance agreement/U tox to take over Vyvanse from psychiatry.  To review and discuss labs.  Overall doing very well     ADD/ADHD follow up. They are well controlled on their current Rx.  No new problems with insomnia, tachycardia, chest pain, or weight loss. The patient has read and signed the [facility] Controlled Substance Contract.  I will continue to see patient for regular follow up appointments and give the lowest effective dose.  They are well controlled on their medication at the lowest effective dose.  He has a previous history of a diagnosis of  ADD/ADHD.   ABDIRIZAK has been reviewed by me and is updated every 3 months. The patient is aware of the potential for addiction and dependence.  The Rx continues to help them concentrate, finish tasks in timely manor, and succeed.  He denies current suicidal and homicidal ideation.  Reviewed causes for potential of discontinuation of stimulant medication,  including but not limited to consideration for diversion, obtaining other controlled substances from other license practitioners, or  inappropriate office behavior.  Patient understands to use a controlled substance as prescribed by physician and to avoid improper use of controlled substances.  Patient will also verbalized understanding that prescriptions to be filled at the same pharmacy  unless office staff is made aware of this.  Patient understands they can be submitted to random urine drug screens and/or random pill counts on any request.  Patient understands they are not to receive early refills.  The patient must produce an official police report for any effort to replace controlled substances that are lost or stolen.  No use of illegal street  drugs while receiving controlled substances from this prescribing provider.  The patient is to take medication as prescribed  and not deviate from the normal schedule without consultation from the provider.  Patient is not to share the medication with others or to take medication with alcohol or other sedatives.  Use caution when driving or operating machinery.  Must always keep the prescription in the original container.  Patient is to store controlled substances in a locked cabinet or other secure storage unit that is cool, dry and out of sunlight.  Patient must immediately notify office if this controlled substances is  improperly taken by another individual.  Reviewed risk for physical dependence, tolerance and addiction.    There is no evidence of aberrant behavior or any red flags.  .   Currently has been seeing psychiatry to manage medication-with recent cost increase and not covered by insurance he would like primary care to take over this medication  Corbin reviewed, CSA signed, U tox ordered today  Has had U tox completed in the last 12 months negative  Works as a  with random drug screens recorded    Anxiety seems to be well-controlled on Lexapro.  Plans to continue current dose.  Denies any medication side effects.  He is currently in counseling.  He would like referral for a Taoism provider to continue telehealth counseling feels this helps him    He did receive a physical for his annual  exam.  Lab results reviewed and discussed.  He does have a follow-up appoint with cardiology and they do plan to do a carotid Doppler ultrasound screening at the end of the month  He does have a family history of hyperlipidemia    Labs from 2/2025 reviewed and discussed in office    No other acute complaints today    The following portions of the patient's history were reviewed and updated as appropriate: allergies, current medications, past family history, past medical history, past social  "history, past surgical history, and problem list        Review of Systems   Constitutional:  Negative for chills, diaphoresis, fatigue and fever.   HENT:  Negative for congestion and sore throat.    Respiratory:  Negative for cough, shortness of breath, wheezing and stridor.    Cardiovascular:  Negative for chest pain, palpitations and leg swelling.   Gastrointestinal:  Negative for abdominal pain, nausea and vomiting.   Genitourinary:  Negative for dysuria.   Musculoskeletal:  Negative for myalgias and neck pain.   Skin:  Negative for rash.   Neurological:  Negative for weakness and numbness.   Psychiatric/Behavioral:  Negative for self-injury and sleep disturbance. The patient is not nervous/anxious.         Objective   Vital Signs:   Vitals:    03/04/25 0832   BP: 122/78   Pulse: 70   Temp: 96.3 °F (35.7 °C)   SpO2: 98%   Weight: 94.3 kg (208 lb)   Height: 182.9 cm (72.01\")   PainSc: 0-No pain            11/21/2024     3:16 PM   PHQ-2/PHQ-9 Depression Screening   Little interest or pleasure in doing things Not at all   Feeling down, depressed, or hopeless Not at all   How difficult have these problems made it for you to do your work, take care of things at home, or get along with other people? Not difficult at all               Physical Exam  Vitals reviewed.   Constitutional:       General: He is not in acute distress.  Eyes:      Conjunctiva/sclera: Conjunctivae normal.   Neck:      Thyroid: No thyromegaly.      Vascular: No carotid bruit.   Cardiovascular:      Rate and Rhythm: Normal rate and regular rhythm.      Heart sounds: Normal heart sounds. No murmur heard.  Pulmonary:      Effort: Pulmonary effort is normal. No respiratory distress.      Breath sounds: Normal breath sounds. No stridor. No wheezing, rhonchi or rales.   Chest:      Chest wall: No tenderness.   Neurological:      Mental Status: He is alert and oriented to person, place, and time.   Psychiatric:         Attention and Perception: Attention " normal.         Mood and Affect: Mood normal.          Result Review :     The following data was reviewed by: KAVITA Tyson on 03/04/2025:      ToxAssure Flex 23, Ur - (03/04/2025 08:46)      Assessment and Plan       Anxiety  Stable-well-controlled  Would like referral for a [facility] provider with insurance change  Prefers telehealth counseling  Orders:    Ambulatory Referral to Behavioral Health    PTSD (post-traumatic stress disorder)  Psychological condition is stable.  Continue current treatment regimen.  Psychological condition  will be reassessed in 6 months.    Orders:    Ambulatory Referral to Behavioral Health    Attention deficit hyperactivity disorder (ADHD), unspecified ADHD type  Psychological condition is stable.  Continue current treatment regimen.  Psychological condition  will be reassessed in 3 months.        Medication working well.  Plans to continue.  Would like to switch to primary provider prescribing related to cost/insurance change  Has has been stable on medication with no side effects  Orders:    Ambulatory Referral to Behavioral Health    High risk medication use  Signed CSA and U tox ordered today  Has had screening up-to-date-negative  Orders:    ToxAssure Flex 23, Ur -    Mixed hyperlipidemia   Lipid abnormalities are stable    Plan:  Has upcoming carotid Doppler scan ordered    Discussed medication dosage, use, side effects, and goals of treatment in detail.    Counseled patient on lifestyle modifications to help control hyperlipidemia.       Patient Treatment Goals:   LDL goal is under 100    Followup in 6 months.               Follow Up   Return in about 3 months (around 6/4/2025) for Video visit, Follow up/ Medication Check.  Patient was given instructions and counseling regarding his condition or for health maintenance advice. Please see specific information pulled into the AVS if appropriate.

## 2025-03-04 NOTE — ASSESSMENT & PLAN NOTE
Psychological condition is stable.  Continue current treatment regimen.  Psychological condition  will be reassessed in 3 months.        Medication working well.  Plans to continue.  Would like to switch to primary provider prescribing related to cost/insurance change  Has has been stable on medication with no side effects  Orders:    Ambulatory Referral to Behavioral Health

## 2025-03-05 DIAGNOSIS — F90.9 ATTENTION DEFICIT HYPERACTIVITY DISORDER (ADHD), UNSPECIFIED ADHD TYPE: Primary | ICD-10-CM

## 2025-03-05 NOTE — TELEPHONE ENCOUNTER
Caller: Delfino Pinto    Relationship: Self    Best call back number: [phone]     Requested Prescriptions:   Requested Prescriptions     Pending Prescriptions Disp Refills    lisdexamfetamine (Vyvanse) 30 MG capsule       Sig: Take 1 capsule by mouth Every Morning        [facility] [phone] [phone] FX     Last office visit with prescribing clinician: 3/4/2025   Last telemedicine visit with prescribing clinician: Visit date not found   Next office visit with prescribing clinician: Visit date not found     Additional details provided by patient: PATIENT STATED HE HAS ABOUT 3 TO 4 CAPSULES REMAINING.    PATIENT STATED HE WILL BE LEAVING [facility] AT 8:00 AM 03-, AD WILL BE GONE FOR TWO WEEKS.    PATIENT IS REQUESTING THIS BE REFILLED ASAP.    Does the patient have less than a 3 day supply:  [x] Yes  [] No    Would you like a call back once the refill request has been completed: [x] Yes [] No    If the office needs to give you a call back, can they leave a voicemail: [x] Yes [] No    Bessy Gutierrez Rep   03/05/25 13:46 EST

## 2025-03-05 NOTE — TELEPHONE ENCOUNTER
Caller: Delfino Pinto    Relationship to patient: Self    Best call back number:[phone]      Patient is needing: HE IS CALLING BACK TO CHECK ON STATUS.  HE IS LEAVING IN THE MORNING AT 7AM AND WILL BE GONE FOR 2 WEEKS AND REALLY NEEDS TO GET THIS FILLED TODAY. PLEASE CALL AND ADVISE WHEN SENT

## 2025-03-05 NOTE — TELEPHONE ENCOUNTER
Rx Refill Note  Requested Prescriptions     Refused Prescriptions Disp Refills    lisdexamfetamine (Vyvanse) 30 MG capsule       Sig: Take 1 capsule by mouth Every Morning      Last office visit with prescribing clinician: 3/4/2025   Last telemedicine visit with prescribing clinician: Visit date not found   Next office visit with prescribing clinician: Visit date not found                         Would you like a call back once the refill request has been completed: [] Yes [] No    If the office needs to give you a call back, can they leave a voicemail: [] Yes [] No    Teddy He MA  03/05/25, 14:57 EST

## 2025-03-06 RX ORDER — LISDEXAMFETAMINE DIMESYLATE 30 MG/1
30 CAPSULE ORAL EVERY MORNING
Qty: 30 CAPSULE | Refills: 0 | Status: SHIPPED | OUTPATIENT
Start: 2025-03-06

## 2025-03-06 RX ORDER — LISDEXAMFETAMINE DIMESYLATE 30 MG/1
30 CAPSULE ORAL EVERY MORNING
OUTPATIENT
Start: 2025-03-06

## 2025-03-08 LAB
1OH-MIDAZOLAM UR QL SCN: NOT DETECTED NG/MG CREAT
6MAM UR QL SCN: NEGATIVE NG/ML
7AMINOCLONAZEPAM/CREAT UR: NOT DETECTED NG/MG CREAT
A-OH ALPRAZ/CREAT UR: NOT DETECTED NG/MG CREAT
A-OH-TRIAZOLAM/CREAT UR CFM: NOT DETECTED NG/MG CREAT
ACP UR QL CFM: NOT DETECTED
ALPRAZ/CREAT UR CFM: NOT DETECTED NG/MG CREAT
AMPHETAMINES UR QL SCN: NEGATIVE NG/ML
APAP UR QL SCN: NEGATIVE UG/ML
BARBITURATES UR QL SCN: NEGATIVE NG/ML
BENZODIAZ SCN METH UR: NEGATIVE
BUPRENORPHINE UR QL SCN: NEGATIVE
BUPRENORPHINE/CREAT UR: NOT DETECTED NG/MG CREAT
CANNABINOIDS UR QL SCN: NEGATIVE NG/ML
CARISOPRODOL UR QL: NEGATIVE NG/ML
CLONAZEPAM/CREAT UR CFM: NOT DETECTED NG/MG CREAT
COCAINE+BZE UR QL SCN: NEGATIVE NG/ML
CREAT UR-MCNC: 79 MG/DL
D-METHORPHAN UR-MCNC: NOT DETECTED NG/ML
D-METHORPHAN+LEVORPHANOL UR QL: NOT DETECTED
DESALKYLFLURAZ/CREAT UR: NOT DETECTED NG/MG CREAT
DIAZEPAM/CREAT UR: NOT DETECTED NG/MG CREAT
ETG ETS UR QL CFM: NORMAL
ETHANOL UR QL SCN: NEGATIVE G/DL
ETHANOL UR QL SCN: NORMAL NG/ML
ETHYL GLUCURONIDE UR CFM-MCNC: NORMAL NG/MG CREAT
ETHYL SULFATE UR CFM-MCNC: 5622 NG/MG CREAT
FENTANYL CTO UR SCN-MCNC: NEGATIVE NG/ML
FENTANYL/CREAT UR: NOT DETECTED NG/MG CREAT
FLUNITRAZEPAM UR QL SCN: NOT DETECTED NG/MG CREAT
GABAPENTIN UR-MCNC: NEGATIVE UG/ML
HALLUCINOGENS UR: NEGATIVE
HYPNOTICS UR QL SCN: NEGATIVE
KETAMINE UR QL: NOT DETECTED
LORAZEPAM/CREAT UR: NOT DETECTED NG/MG CREAT
MEPERIDINE UR QL SCN: NEGATIVE NG/ML
METHADONE UR QL SCN: NEGATIVE NG/ML
METHADONE+METAB UR QL SCN: NEGATIVE NG/ML
MIDAZOLAM/CREAT UR CFM: NOT DETECTED NG/MG CREAT
MISCELLANEOUS, UR: NEGATIVE
NORBUPRENORPHINE/CREAT UR: NOT DETECTED NG/MG CREAT
NORDIAZEPAM/CREAT UR: NOT DETECTED NG/MG CREAT
NORFENTANYL/CREAT UR: NOT DETECTED NG/MG CREAT
NORFLUNITRAZEPAM UR-MCNC: NOT DETECTED NG/MG CREAT
NORKETAMINE UR-MCNC: NOT DETECTED UG/ML
OPIATES UR SCN-MCNC: NEGATIVE NG/ML
OXAZEPAM/CREAT UR: NOT DETECTED NG/MG CREAT
OXYCODONE CTO UR SCN-MCNC: NEGATIVE NG/ML
PCP UR QL SCN: NEGATIVE NG/ML
PRESCRIBED MEDICATIONS: NORMAL
PROPOXYPH UR QL SCN: NEGATIVE NG/ML
TAPENTADOL CTO UR SCN-MCNC: NEGATIVE NG/ML
TEMAZEPAM/CREAT UR: NOT DETECTED NG/MG CREAT
TRAMADOL UR QL SCN: NEGATIVE NG/ML
ZALEPLON UR-MCNC: NOT DETECTED NG/ML
ZOLPIDEM PHENYL-4-CARB UR QL SCN: NOT DETECTED
ZOLPIDEM UR QL SCN: NOT DETECTED
ZOPICLONE-N-OXIDE UR-MCNC: NOT DETECTED NG/ML

## 2025-04-01 ENCOUNTER — TELEMEDICINE (OUTPATIENT)
Dept: PSYCHIATRY | Facility: CLINIC | Age: 35
End: 2025-04-01
Payer: COMMERCIAL

## 2025-04-01 DIAGNOSIS — F90.2 ATTENTION DEFICIT HYPERACTIVITY DISORDER, COMBINED TYPE: ICD-10-CM

## 2025-04-01 DIAGNOSIS — F41.1 GENERALIZED ANXIETY DISORDER: Primary | ICD-10-CM

## 2025-04-01 RX ORDER — ESCITALOPRAM OXALATE 10 MG/1
10 TABLET ORAL DAILY
Qty: 90 TABLET | Refills: 0 | Status: SHIPPED | OUTPATIENT
Start: 2025-04-01
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
## 2025-04-04 ENCOUNTER — TELEPHONE (OUTPATIENT)
Dept: PSYCHIATRY | Facility: CLINIC | Age: 35
End: 2025-04-04
Payer: COMMERCIAL

## 2025-04-04 DIAGNOSIS — F90.9 ATTENTION DEFICIT HYPERACTIVITY DISORDER (ADHD), UNSPECIFIED ADHD TYPE: ICD-10-CM

## 2025-04-04 RX ORDER — LISDEXAMFETAMINE DIMESYLATE 30 MG/1
30 CAPSULE ORAL EVERY MORNING
Qty: 30 CAPSULE | Refills: 0 | Status: SHIPPED | OUTPATIENT
Start: 2025-04-04

## 2025-04-04 RX ORDER — LISDEXAMFETAMINE DIMESYLATE 30 MG/1
30 CAPSULE ORAL EVERY MORNING
Qty: 30 CAPSULE | Refills: 0 | Status: CANCELLED | OUTPATIENT
Start: 2025-04-04

## 2025-04-04 NOTE — TELEPHONE ENCOUNTER
ABDIRIZAK query complete. Treatment plan to include limited course of prescribed  controlled substance. Risks including addiction, benefits, and alternatives presented to patient.     Last office visit 03/04/2024  Last refill 03/11/2025

## 2025-04-04 NOTE — TELEPHONE ENCOUNTER
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 Patient was made aware that provider is out of office on Fridays and will not return until Monday. Patient is leaving tonight at 8 pm. Patient is going to reach out to pcp and ask if they will cover the refill for now and will call this office back to make note of it.

## 2025-04-04 NOTE — TELEPHONE ENCOUNTER
Caller: Delfino Pinto    Relationship: Self    Best call back number: [phone]     Requested Prescriptions:   Requested Prescriptions     Pending Prescriptions Disp Refills    lisdexamfetamine (Vyvanse) 30 MG capsule 30 capsule 0     Sig: Take 1 capsule by mouth Every Morning        [facility] [address] [phone] [phone] FX     Last office visit with prescribing clinician: 3/4/2025   Last telemedicine visit with prescribing clinician: Visit date not found   Next office visit with prescribing clinician: Visit date not found     Additional details provided by patient: 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     PATIENT NEEDS THIS BEFORE END OF DAY.     Does the patient have less than a 3 day supply:  [x] Yes  [] No    Would you like a call back once the refill request has been completed: [] Yes [x] No    If the office needs to give you a call back, can they leave a voicemail: [] Yes [x] No    Bessy Rea Rep   04/04/25 08:56 EDT         DELETE AFTER READING TO PATIENT: “Thank you for sharing this information with me. I will send a message to the clinical team. Please allow 48 hours for the clinical staff to follow up on this request.”

## 2025-04-04 NOTE — TELEPHONE ENCOUNTER
Rx Refill Note  Requested Prescriptions     Pending Prescriptions Disp Refills    lisdexamfetamine (Vyvanse) 30 MG capsule 30 capsule 0     Sig: Take 1 capsule by mouth Every Morning      Last office visit with prescribing clinician: 3/4/2025   Last telemedicine visit with prescribing clinician: Visit date not found   Next office visit with prescribing clinician: Visit date not found                         Would you like a call back once the refill request has been completed: [] Yes [] No    If the office needs to give you a call back, can they leave a voicemail: [] Yes [] No    Teddy He MA  04/04/25, 09:06 EDT

## 2025-05-12 ENCOUNTER — TELEMEDICINE (OUTPATIENT)
Dept: PSYCHIATRY | Facility: CLINIC | Age: 35
End: 2025-05-12
Payer: COMMERCIAL

## 2025-05-12 DIAGNOSIS — F90.9 ATTENTION DEFICIT HYPERACTIVITY DISORDER (ADHD), UNSPECIFIED ADHD TYPE: Primary | Chronic | ICD-10-CM

## 2025-05-12 DIAGNOSIS — F41.1 GENERALIZED ANXIETY DISORDER: Chronic | ICD-10-CM

## 2025-05-12 PROCEDURE — 99214 OFFICE O/P EST MOD 30 MIN: CPT

## 2025-05-12 PROCEDURE — 96127 BRIEF EMOTIONAL/BEHAV ASSMT: CPT

## 2025-05-12 RX ORDER — ESCITALOPRAM OXALATE 10 MG/1
10 TABLET ORAL DAILY
Qty: 90 TABLET | Refills: 0 | Status: SHIPPED | OUTPATIENT
Start: 2025-05-12

## 2025-05-12 RX ORDER — LISDEXAMFETAMINE DIMESYLATE 40 MG/1
40 CAPSULE ORAL EVERY MORNING
Qty: 30 CAPSULE | Refills: 0 | Status: SHIPPED | OUTPATIENT
Start: 2025-05-12

## 2025-05-12 NOTE — PROGRESS NOTES
This provider is located at [place], [place]. The Patient is seen remotely using Video. Patient is being seen via telehealth and confirm that they are in a secure environment for this session. Patient is located in [place], [place] at his work. The patient's condition being diagnosed/treated is appropriate for telemedicine. Provider identified as Aj Liao as well as credentials APRN MSN PMHNP-BC.   The client/patient gave consent to be seen remotely, and when consent is given they understand that the consent allows for patient identifiable information to be sent to a third party as needed.  They may refuse to be seen remotely at any time. The electronic data is encrypted and password protected, and the patient has been advised of the potential risks to privacy not withstanding such measures.    Chief Complaint  Anxiety and ADHD    Subjective        Delfino Pinto presents to [facility] for   History of Present Illness  Patient seen today for follow-up visit for anxiety and ADHD.  Patient reports that he did try to take some days off from taking the Vyvanse.  He states initially it seemed like it helped the medication last longer the way he needs it to, but eventually wore back off to what it was doing it before.  He is asking for further help with his focus and attention.  He states that anxiety overall is been well controlled.  States he had a few episodes where he would feel anxious, but he was able to refocus and control it.  Denies depression.  Sleep and appetite are good. Denies hopelessness. Denies suicidal or homicidal ideation. Denies any manic type symptoms. Denies any paranoia. Denies any auditory of visual hallucinations. Denies any side effects to the medications.    Objective   Vital Signs:   There were no vitals taken for this visit.      Mental Status Exam:   Hygiene:   good  Cooperation:  Cooperative  Eye Contact:  Good  Psychomotor Behavior:  Appropriate  Affect:   Full range  Mood: normal  Speech:  Normal  Thought Process:  Goal directed  Thought Content:  Normal  Suicidal:  None  Homicidal:  None  Hallucinations:  None  Delusion:  None  Memory:  Intact  Orientation:  Person, Place, Time, and Situation  Reliability:  good  Insight:  Good  Judgement:  Good  Impulse Control:  Good  Physical/Medical Issues:  No      PHQ-9 Depression Screening  Little interest or pleasure in doing things? (Patient-Rptd) Several days   Feeling down, depressed, or hopeless? (Patient-Rptd) Not at all   PHQ-2 Total Score (Patient-Rptd) 1   Trouble falling or staying asleep, or sleeping too much? (Patient-Rptd) Several days   Feeling tired or having little energy? (Patient-Rptd) Several days   Poor appetite or overeating? (Patient-Rptd) Not at all   Feeling bad about yourself - or that you are a failure or have let yourself or your family down? (Patient-Rptd) Several days   Trouble concentrating on things, such as reading the newspaper or watching television? (Patient-Rptd) Several days   Moving or speaking so slowly that other people could have noticed? Or the opposite - being so fidgety or restless that you have been moving around a lot more than usual? (Patient-Rptd) Not at all   Thoughts that you would be better off dead, or of hurting yourself in some way? (Patient-Rptd) Not at all   PHQ-9 Total Score (Patient-Rptd) 5   If you checked off any problems, how difficult have these problems made it for you to do your work, take care of things at home, or get along with other people? (Patient-Rptd) Not difficult at all         PHQ-9 Total Score: (Patient-Rptd) 5     SHYAM-7  Feeling nervous, anxious or on edge: (Patient-Rptd) Several days  Not being able to stop or control worrying: (Patient-Rptd) Several days  Worrying too much about different things: (Patient-Rptd) Several days  Trouble Relaxing: (Patient-Rptd) Several days  Being so restless that it is hard to sit still: (Patient-Rptd) Several  "days  Feeling afraid as if something awful might happen: (Patient-Rptd) Several days  Becoming easily annoyed or irritable: (Patient-Rptd) Several days  SHYAM 7 Total Score: (Patient-Rptd) 7  If you checked any problems, how difficult have these problems made it for you to do your work, take care of things at home, or get along with other people: (Patient-Rptd) Not difficult at all    PDMP reviewed    Previous Provider notes and available records reviewed by this APRN today.   Current Medications:   Current Outpatient Medications   Medication Sig Dispense Refill    escitalopram (LEXAPRO) 10 MG tablet Take 1 tablet by mouth Daily. 90 tablet 0    lisdexamfetamine (Vyvanse) 40 MG capsule Take 1 capsule by mouth Every Morning 30 capsule 0    albuterol sulfate  (90 Base) MCG/ACT inhaler Take 1-2 puffs 15 minutes prior to exercise. 8 g 1    B-D 3CC LUER-DELBERT SYR 44ND1-4/2 22G X 1-1/2\" 3 ML misc USE TO INJECT TESTOSTERONE ONCE WEEKLY      BD Hypodermic Needle 18G X 1\" misc USE TO INJECT TESTOSTERONE ONCE WEEKLY      Testosterone Cypionate (DEPOTESTOTERONE CYPIONATE) 200 MG/ML injection Inject 0.5 mL into the appropriate muscle as directed by prescriber Every 14 (Fourteen) Days.       No current facility-administered medications for this visit.       Physical Exam   Result Review :    The following data was reviewed by: KAVITA Ochoa on 05/12/2025:  Common labs          1/23/2025    09:45   Common Labs   Glucose 69    BUN 9    Creatinine 1.19    Sodium 138    Potassium 4.6    Chloride 100    Calcium 9.6    Albumin 4.6    Total Bilirubin 1.5    Alkaline Phosphatase 67    AST (SGOT) 30    ALT (SGPT) 24    WBC 5.42    Hemoglobin 15.3    Hematocrit 45.2    Platelets 279    Total Cholesterol 228    Triglycerides 67    HDL Cholesterol 48    LDL Cholesterol  168    Uric Acid 5.0         Assessment and Plan   Problem List Items Addressed This Visit          Mental Health    Attention deficit hyperactivity disorder " (ADHD) - Primary    Relevant Medications    lisdexamfetamine (Vyvanse) 40 MG capsule    escitalopram (LEXAPRO) 10 MG tablet    Generalized anxiety disorder    Relevant Medications    lisdexamfetamine (Vyvanse) 40 MG capsule    escitalopram (LEXAPRO) 10 MG tablet     Discussed treatment options with patient.  Discussed with patient that we can increase his Vyvanse to 40 mg daily to further target his ADHD symptoms.  Patient agreeable.  Continue Lexapro 10 mg daily for anxiety.  We will see patient again in 4 weeks to reassess.  Encouraged patient to contact the office if he has any issues sooner.    Patient does ask for a letter to be sent to him as his work is requiring a letter due to him being on the Vyvanse.  We will send that via my chart.    TREATMENT PLAN/GOALS: Continue supportive psychotherapy efforts and medications as indicated. Treatment and medication options discussed during today's visit. Patient ackowledged and verbally consented to continue with current treatment plan and was educated on the importance of compliance with treatment and follow-up appointments.    DEPRESSION:  Patient screened positive for depression based on a PHQ-9 score of 5 on 5/10/2025. Follow-up recommendations include: Suicide Risk Assessment performed.       MEDICATION ISSUES:  We discussed risks, benefits, and side effects of the above medications and the patient was agreeable with the plan. Patient was educated on the importance of compliance with treatment and follow-up appointments.  Patient is agreeable to call the office with any worsening of symptoms or onset of side effects. Patient is agreeable to call 911 or go to the nearest ER should he/she begin having SI/HI.      Counseled patient regarding multimodal approach with healthy nutrition, healthy sleep, regular physical activity, social activities, counseling, and medications.      Coping skills reviewed and encouraged positive framing of thoughts     Assisted patient  in processing above session content; acknowledged and normalized patient’s thoughts, feelings, and concerns.  Applied  positive coping skills and behavior management in session.  Allowed patient to freely discuss issues without interruption or judgment. Provided safe, confidential environment to facilitate the development of positive therapeutic relationship and encourage open, honest communication. Assisted patient in identifying risk factors which would indicate the need for higher level of care including thoughts to harm self or others and/or self-harming behavior and encouraged patient to contact this office, call 911, or present to the nearest emergency room should any of these events occur. Discussed crisis intervention services and means to access. If patient has any concerns or needs assistance they were instructed to call the [facility] at [phone].    MEDS ORDERED DURING VISIT:  New Medications Ordered This Visit   Medications    lisdexamfetamine (Vyvanse) 40 MG capsule     Sig: Take 1 capsule by mouth Every Morning     Dispense:  30 capsule     Refill:  0     Refill when Due    escitalopram (LEXAPRO) 10 MG tablet     Sig: Take 1 tablet by mouth Daily.     Dispense:  90 tablet     Refill:  0         Follow Up   Return in about 4 weeks (around 6/9/2025) for Video visit.    Patient was given instructions and counseling regarding his condition or for health maintenance advice. Please see specific information pulled into the AVS if appropriate.         This document has been electronically signed by KAVITA Ochoa  May 12, 2025 11:54 EDT    Part of this note may be an electronic transcription/translation of spoken language to printed text using the Dragon Dictation System.

## 2025-05-12 NOTE — LETTER
RE: Delfino Pinto -1990      To Whom It May Concern:    Delfino is currently taking Vyvanse for ADHD.  He has been stable on this medication with no side effects.  It is my medical opinion that this condition is controlled at this time.  If you have any questions feel free to call the office at [phone].    Sincerely,       This document has been electronically signed by KAVITA Ochoa  May 12, 2025 11:57 EDT

## 2025-06-04 DIAGNOSIS — R05.2 SUBACUTE COUGH: ICD-10-CM

## 2025-06-04 RX ORDER — ALBUTEROL SULFATE 90 UG/1
INHALANT RESPIRATORY (INHALATION)
Qty: 9 G | Refills: 0 | Status: SHIPPED | OUTPATIENT
Start: 2025-06-04

## 2025-06-09 DIAGNOSIS — F90.9 ATTENTION DEFICIT HYPERACTIVITY DISORDER (ADHD), UNSPECIFIED ADHD TYPE: Chronic | ICD-10-CM

## 2025-06-09 RX ORDER — LISDEXAMFETAMINE DIMESYLATE 40 MG/1
40 CAPSULE ORAL EVERY MORNING
Qty: 30 CAPSULE | Refills: 0 | Status: SHIPPED | OUTPATIENT
Start: 2025-06-09

## 2025-07-07 ENCOUNTER — TELEPHONE (OUTPATIENT)
Dept: PSYCHIATRY | Facility: CLINIC | Age: 35
End: 2025-07-07
Payer: COMMERCIAL

## 2025-07-07 DIAGNOSIS — F90.9 ATTENTION DEFICIT HYPERACTIVITY DISORDER (ADHD), UNSPECIFIED ADHD TYPE: Chronic | ICD-10-CM

## 2025-07-08 DIAGNOSIS — F90.9 ATTENTION DEFICIT HYPERACTIVITY DISORDER (ADHD), UNSPECIFIED ADHD TYPE: Chronic | ICD-10-CM

## 2025-07-08 RX ORDER — LISDEXAMFETAMINE DIMESYLATE 40 MG/1
40 CAPSULE ORAL EVERY MORNING
Qty: 30 CAPSULE | Refills: 0 | Status: SHIPPED | OUTPATIENT
Start: 2025-07-08 | End: 2025-07-10 | Stop reason: SDUPTHER

## 2025-07-08 RX ORDER — LISDEXAMFETAMINE DIMESYLATE 40 MG/1
40 CAPSULE ORAL EVERY MORNING
Qty: 30 CAPSULE | Refills: 0 | OUTPATIENT
Start: 2025-07-08

## 2025-07-10 ENCOUNTER — TELEMEDICINE (OUTPATIENT)
Dept: PSYCHIATRY | Facility: CLINIC | Age: 35
End: 2025-07-10
Payer: COMMERCIAL

## 2025-07-10 DIAGNOSIS — F90.2 ATTENTION DEFICIT HYPERACTIVITY DISORDER, COMBINED TYPE: Primary | Chronic | ICD-10-CM

## 2025-07-10 DIAGNOSIS — F41.1 GENERALIZED ANXIETY DISORDER: Chronic | ICD-10-CM

## 2025-07-10 RX ORDER — ESCITALOPRAM OXALATE 10 MG/1
10 TABLET ORAL DAILY
Qty: 90 TABLET | Refills: 0 | Status: SHIPPED | OUTPATIENT
Start: 2025-07-10

## 2025-07-10 RX ORDER — LISDEXAMFETAMINE DIMESYLATE 40 MG/1
40 CAPSULE ORAL EVERY MORNING
Qty: 30 CAPSULE | Refills: 0 | Status: SHIPPED | OUTPATIENT
Start: 2025-07-10

## 2025-07-10 NOTE — PROGRESS NOTES
This provider is located at [place], [place]. The Patient is seen remotely using Video. Patient is being seen via telehealth and confirm that they are in a secure environment for this session. Patient is located in [place], [place] at his home. The patient's condition being diagnosed/treated is appropriate for telemedicine. Provider identified as Aj Liao as well as credentials KAVITA MSN PMHNP-BC.   The client/patient gave consent to be seen remotely, and when consent is given they understand that the consent allows for patient identifiable information to be sent to a third party as needed.  They may refuse to be seen remotely at any time. The electronic data is encrypted and password protected, and the patient has been advised of the potential risks to privacy not withstanding such measures.    Chief Complaint  Anxiety and ADHD    Subjective        Delfino Pitno presents to [facility] for   History of Present Illness  Patient seen today for follow-up visit for anxiety and ADHD.  Patient reports he is doing well.  He states the increased dose of Vyvanse has helped control his focus and attention symptoms.  States now that medication is lasting until 4 PM.  He is still taking holidays on the weekends from taking the medication.  States anxiety has been under control.  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  Feels he has dealt with that appropriately.  Denies depression.  Sleep and appetite are good. Denies hopelessness. Denies suicidal or homicidal ideation. Denies any manic type symptoms. Denies any paranoia. Denies any auditory of visual hallucinations. Denies any side effects to the medications.    Objective   Vital Signs:   There were no vitals taken for this visit.      Mental Status Exam:   Hygiene:   good  Cooperation:  Cooperative  Eye Contact:  Good  Psychomotor Behavior:   Appropriate  Affect:  Full range  Mood: normal  Speech:  Normal  Thought Process:  Goal directed  Thought Content:  Normal  Suicidal:  None  Homicidal:  None  Hallucinations:  None  Delusion:  None  Memory:  Intact  Orientation:  Person, Place, Time, and Situation  Reliability:  good  Insight:  Good  Judgement:  Good  Impulse Control:  Good  Physical/Medical Issues:  No      PHQ-9 Depression Screening  Little interest or pleasure in doing things? (Patient-Rptd) Several days   Feeling down, depressed, or hopeless? (Patient-Rptd) Several days   PHQ-2 Total Score (Patient-Rptd) 2   Trouble falling or staying asleep, or sleeping too much? (Patient-Rptd) Several days   Feeling tired or having little energy? (Patient-Rptd) Over half   Poor appetite or overeating? (Patient-Rptd) Not at all   Feeling bad about yourself - or that you are a failure or have let yourself or your family down? (Patient-Rptd) Not at all   Trouble concentrating on things, such as reading the newspaper or watching television? (Patient-Rptd) Several days   Moving or speaking so slowly that other people could have noticed? Or the opposite - being so fidgety or restless that you have been moving around a lot more than usual? (Patient-Rptd) Not at all   Thoughts that you would be better off dead, or of hurting yourself in some way? (Patient-Rptd) Not at all   PHQ-9 Total Score (Patient-Rptd) 6   If you checked off any problems, how difficult have these problems made it for you to do your work, take care of things at home, or get along with other people? (Patient-Rptd) Somewhat difficult         PHQ-9 Total Score: (Patient-Rptd) 6     SHYAM-7  Feeling nervous, anxious or on edge: (Patient-Rptd) Several days  Not being able to stop or control worrying: (Patient-Rptd) Several days  Worrying too much about different things: (Patient-Rptd) Several days  Trouble Relaxing: (Patient-Rptd) Several days  Being so restless that it is hard to sit still:  "(Patient-Rptd) Not at all  Feeling afraid as if something awful might happen: (Patient-Rptd) Several days  Becoming easily annoyed or irritable: (Patient-Rptd) Several days  SHYAM 7 Total Score: (Patient-Rptd) 6  If you checked any problems, how difficult have these problems made it for you to do your work, take care of things at home, or get along with other people: (Patient-Rptd) Somewhat difficult    PDMP reviewed    Previous Provider notes and available records reviewed by this APRN today.   Current Medications:   Current Outpatient Medications   Medication Sig Dispense Refill    escitalopram (LEXAPRO) 10 MG tablet Take 1 tablet by mouth Daily. 90 tablet 0    lisdexamfetamine (Vyvanse) 40 MG capsule Take 1 capsule by mouth Every Morning 30 capsule 0    albuterol sulfate  (90 Base) MCG/ACT inhaler INHALE 1 TO 2 PUFFS BY MOUTH 15 MINUTES PRIOR TO EXERCISE. 9 g 0    B-D 3CC LUER-DELBERT SYR 87WL4-7/2 22G X 1-1/2\" 3 ML misc USE TO INJECT TESTOSTERONE ONCE WEEKLY      BD Hypodermic Needle 18G X 1\" misc USE TO INJECT TESTOSTERONE ONCE WEEKLY      Testosterone Cypionate (DEPOTESTOTERONE CYPIONATE) 200 MG/ML injection Inject 0.5 mL into the appropriate muscle as directed by prescriber Every 14 (Fourteen) Days.       No current facility-administered medications for this visit.       Physical Exam   Result Review :    The following data was reviewed by: KAVITA Ochoa on 07/10/2025:  Common labs          1/23/2025    09:45   Common Labs   Glucose 69    BUN 9    Creatinine 1.19    Sodium 138    Potassium 4.6    Chloride 100    Calcium 9.6    Albumin 4.6    Total Bilirubin 1.5    Alkaline Phosphatase 67    AST (SGOT) 30    ALT (SGPT) 24    WBC 5.42    Hemoglobin 15.3    Hematocrit 45.2    Platelets 279    Total Cholesterol 228    Triglycerides 67    HDL Cholesterol 48    LDL Cholesterol  168    Uric Acid 5.0         Assessment and Plan   Problem List Items Addressed This Visit          Mental Health    Attention " deficit hyperactivity disorder, combined type - Primary    Relevant Medications    lisdexamfetamine (Vyvanse) 40 MG capsule    escitalopram (LEXAPRO) 10 MG tablet    Generalized anxiety disorder    Relevant Medications    lisdexamfetamine (Vyvanse) 40 MG capsule    escitalopram (LEXAPRO) 10 MG tablet     Discussed treatment options with patient.  Continue Vyvanse 40 mg daily for ADHD.  Continue Lexapro 10 mg daily for anxiety.  We will see patient again in 8 weeks to reassess.  Encouraged patient to contact the office if he has any issues sooner.    TREATMENT PLAN/GOALS: Continue supportive psychotherapy efforts and medications as indicated. Treatment and medication options discussed during today's visit. Patient ackowledged and verbally consented to continue with current treatment plan and was educated on the importance of compliance with treatment and follow-up appointments.    DEPRESSION:  Patient screened positive for depression based on a PHQ-9 score of 6 on 7/9/2025. Follow-up recommendations include: Suicide Risk Assessment performed.       MEDICATION ISSUES:  We discussed risks, benefits, and side effects of the above medications and the patient was agreeable with the plan. Patient was educated on the importance of compliance with treatment and follow-up appointments.  Patient is agreeable to call the office with any worsening of symptoms or onset of side effects. Patient is agreeable to call 911 or go to the nearest ER should he/she begin having SI/HI.      Counseled patient regarding multimodal approach with healthy nutrition, healthy sleep, regular physical activity, social activities, counseling, and medications.      Coping skills reviewed and encouraged positive framing of thoughts     Assisted patient in processing above session content; acknowledged and normalized patient’s thoughts, feelings, and concerns.  Applied  positive coping skills and behavior management in session.  Allowed patient to  freely discuss issues without interruption or judgment. Provided safe, confidential environment to facilitate the development of positive therapeutic relationship and encourage open, honest communication. Assisted patient in identifying risk factors which would indicate the need for higher level of care including thoughts to harm self or others and/or self-harming behavior and encouraged patient to contact this office, call 911, or present to the nearest emergency room should any of these events occur. Discussed crisis intervention services and means to access. If patient has any concerns or needs assistance they were instructed to call the [facility] at [phone].    MEDS ORDERED DURING VISIT:  New Medications Ordered This Visit   Medications    lisdexamfetamine (Vyvanse) 40 MG capsule     Sig: Take 1 capsule by mouth Every Morning     Dispense:  30 capsule     Refill:  0     Refill when Due    escitalopram (LEXAPRO) 10 MG tablet     Sig: Take 1 tablet by mouth Daily.     Dispense:  90 tablet     Refill:  0         Follow Up   Return in about 8 weeks (around 9/4/2025) for Video visit.    Patient was given instructions and counseling regarding his condition or for health maintenance advice. Please see specific information pulled into the AVS if appropriate.         This document has been electronically signed by KAVITA Ochoa  July 10, 2025 11:41 EDT    Part of this note may be an electronic transcription/translation of spoken language to printed text using the Dragon Dictation System.

## 2025-08-04 DIAGNOSIS — F90.2 ATTENTION DEFICIT HYPERACTIVITY DISORDER, COMBINED TYPE: Chronic | ICD-10-CM

## 2025-08-04 RX ORDER — LISDEXAMFETAMINE DIMESYLATE 40 MG/1
40 CAPSULE ORAL EVERY MORNING
Qty: 30 CAPSULE | Refills: 0 | Status: SHIPPED | OUTPATIENT
Start: 2025-08-04

## 2025-08-25 ENCOUNTER — TELEPHONE (OUTPATIENT)
Dept: FAMILY MEDICINE CLINIC | Facility: CLINIC | Age: 35
End: 2025-08-25
Payer: COMMERCIAL